# Patient Record
Sex: FEMALE | URBAN - METROPOLITAN AREA
[De-identification: names, ages, dates, MRNs, and addresses within clinical notes are randomized per-mention and may not be internally consistent; named-entity substitution may affect disease eponyms.]

---

## 2021-10-25 ENCOUNTER — NURSE TRIAGE (OUTPATIENT)
Dept: NURSING | Facility: CLINIC | Age: 21
End: 2021-10-25

## 2021-10-26 NOTE — TELEPHONE ENCOUNTER
Patient calling reporting she has vaginal bleeding for 9 days now. States she got her menstrual cycle when she was not taking her placebo birth control pills. Reports using about 5-6 pads in a day. Denies abdominal pain or fever. States she is scheduled to start new pack of her contraceptive medication and asking for advise of if she should or any changes that needs to be made. Per guideline, advised patient to be seen within 2-3 days by PCP. Advised to follow up with PCP for her birth control medication question as well. Caller verbalized understanding. Denies further questions.      Blaine Landeros RN  [facility]     COVID 19 Nurse Triage Plan/Patient Instructions    Please be aware that novel coronavirus (COVID-19) may be circulating in the community. If you develop symptoms such as fever, cough, or SOB or if you have concerns about the presence of another infection including coronavirus (COVID-19), please contact your health care provider or visit [email or web address].     Disposition/Instructions    In-Person Visit with provider recommended. Reference Visit Selection Guide.    Thank you for taking steps to prevent the spread of this virus.  o Limit your contact with others.  o Wear a simple mask to cover your cough.  o Wash your hands well and often.    Resources    [facility]: About COVID-19: [email or web address]    CDC: What to Do If You're Sick: www.cdc.gov/coronavirus/2019-ncov/about/steps-when-sick.html    CDC: Ending Home Isolation: www.cdc.gov/coronavirus/2019-ncov/hcp/disposition-in-home-patients.html     CDC: Caring for Someone: www.cdc.gov/coronavirus/2019-ncov/if-you-are-sick/care-for-someone.html     [facility]: Interim Guidance for Hospital Discharge to Home: [email or web address]/[email or web address]/hcp/hospdischarge.pdf    [facility] clinical trials (COVID-19 research studies): [email or web address]     Below are the  COVID-19 hotlines at the [place] Department of Health ([facility]). Interpreters are available.   o For health questions: Call 091-826-9910 or 1-897.514.8144 (7 a.m. to 7 p.m.)  o For questions about schools and childcare: Call 696-730-2451 or 1-626.856.4586 (7 a.m. to 7 p.m.)                       Reason for Disposition    [1] Periods with > 6 soaked pads or tampons per day AND [2] lasts > 7 days    Additional Information    Negative: [1] SEVERE abdominal pain (e.g., excruciating) AND [2] present > 1 hour    Negative: [1] SEVERE vaginal bleeding (e.g., soaking 2 pads or tampons per hour) AND [2] present 2 or more hours    Negative: Patient sounds very sick or weak to the triager    Negative: [1] MODERATE vaginal bleeding (i.e., soaking 1 pad or tampon per hour AND [2] present > 6 hours)    Negative: [1] Constant abdominal pain AND [2] present > 2 hours    Negative: [1] Caller has URGENT question AND [2] triager unable to answer question    Protocols used: CONTRACEPTION - BIRTH CONTROL PILLS - COMBINED-A-AH